# Patient Record
Sex: FEMALE | Race: BLACK OR AFRICAN AMERICAN | NOT HISPANIC OR LATINO | URBAN - METROPOLITAN AREA
[De-identification: names, ages, dates, MRNs, and addresses within clinical notes are randomized per-mention and may not be internally consistent; named-entity substitution may affect disease eponyms.]

---

## 2017-11-22 ENCOUNTER — NEW PATIENT (OUTPATIENT)
Dept: URBAN - METROPOLITAN AREA CLINIC 10 | Facility: CLINIC | Age: 72
End: 2017-11-22
Payer: COMMERCIAL

## 2017-11-22 PROCEDURE — 92083 EXTENDED VISUAL FIELD XM: CPT | Performed by: OPTOMETRIST

## 2017-11-22 PROCEDURE — 92004 COMPRE OPH EXAM NEW PT 1/>: CPT | Performed by: OPTOMETRIST

## 2017-11-22 PROCEDURE — 92015 DETERMINE REFRACTIVE STATE: CPT | Performed by: OPTOMETRIST

## 2017-11-22 PROCEDURE — 92133 CPTRZD OPH DX IMG PST SGM ON: CPT | Performed by: OPTOMETRIST

## 2017-11-22 RX ORDER — TRAVOPROST 0.04 MG/ML
0.004 % SOLUTION/ DROPS OPHTHALMIC
Qty: 3 | Refills: 4 | Status: INACTIVE
Start: 2017-11-22 | End: 2020-12-09

## 2017-11-22 RX ORDER — BRIMONIDINE TARTRATE 2 MG/ML
0.2 % SOLUTION/ DROPS OPHTHALMIC
Qty: 3 | Refills: 4 | Status: INACTIVE
Start: 2017-11-22 | End: 2020-12-09

## 2017-11-22 RX ORDER — DORZOLAMIDE HYDROCHLORIDE AND TIMOLOL MALEATE 20; 5 MG/ML; MG/ML
SOLUTION/ DROPS OPHTHALMIC
Qty: 3 | Refills: 4 | Status: INACTIVE
Start: 2017-11-22 | End: 2020-12-09

## 2017-11-22 RX ORDER — DORZOLAMIDE HYDROCHLORIDE AND TIMOLOL MALEATE 20; 5 MG/ML; MG/ML
SOLUTION/ DROPS OPHTHALMIC
Qty: 0 | Refills: 0 | Status: INACTIVE
Start: 2017-11-22 | End: 2018-12-08

## 2017-11-22 ASSESSMENT — INTRAOCULAR PRESSURE
OD: 46
OS: 23

## 2017-11-22 ASSESSMENT — VISUAL ACUITY
OS: 20/20
OD: 20/20

## 2017-11-22 ASSESSMENT — KERATOMETRY
OD: 43.75
OS: 43.13

## 2017-12-06 ENCOUNTER — FOLLOW UP ESTABLISHED (OUTPATIENT)
Dept: URBAN - METROPOLITAN AREA CLINIC 10 | Facility: CLINIC | Age: 72
End: 2017-12-06
Payer: COMMERCIAL

## 2017-12-06 PROCEDURE — 92012 INTRM OPH EXAM EST PATIENT: CPT | Performed by: OPTOMETRIST

## 2017-12-06 PROCEDURE — 76514 ECHO EXAM OF EYE THICKNESS: CPT | Performed by: OPTOMETRIST

## 2017-12-06 ASSESSMENT — INTRAOCULAR PRESSURE
OD: 16
OS: 15

## 2020-12-09 ENCOUNTER — NEW PATIENT (OUTPATIENT)
Dept: URBAN - METROPOLITAN AREA CLINIC 10 | Facility: CLINIC | Age: 75
End: 2020-12-09
Payer: COMMERCIAL

## 2020-12-09 PROCEDURE — 92004 COMPRE OPH EXAM NEW PT 1/>: CPT | Performed by: OPTOMETRIST

## 2020-12-09 RX ORDER — LATANOPROST 50 UG/ML
0.005 % SOLUTION OPHTHALMIC
Qty: 2.5 | Refills: 3 | Status: INACTIVE
Start: 2020-12-09 | End: 2021-02-25

## 2020-12-09 RX ORDER — DORZOLAMIDE HYDROCHLORIDE AND TIMOLOL MALEATE 20; 5 MG/ML; MG/ML
SOLUTION/ DROPS OPHTHALMIC
Qty: 2.5 | Refills: 4 | Status: INACTIVE
Start: 2020-12-09 | End: 2022-03-01

## 2020-12-09 ASSESSMENT — INTRAOCULAR PRESSURE
OD: 38
OS: 18
OD: 35

## 2020-12-09 ASSESSMENT — VISUAL ACUITY
OD: 20/HM
OS: 20/25

## 2021-01-14 ENCOUNTER — FOLLOW UP ESTABLISHED (OUTPATIENT)
Dept: URBAN - METROPOLITAN AREA CLINIC 10 | Facility: CLINIC | Age: 76
End: 2021-01-14
Payer: COMMERCIAL

## 2021-01-14 PROCEDURE — 92012 INTRM OPH EXAM EST PATIENT: CPT | Performed by: OPTOMETRIST

## 2021-01-14 RX ORDER — BRIMONIDINE TARTRATE 2 MG/ML
0.2 % SOLUTION/ DROPS OPHTHALMIC
Qty: 2.5 | Refills: 1 | Status: INACTIVE
Start: 2021-01-14 | End: 2021-02-08

## 2021-01-14 ASSESSMENT — INTRAOCULAR PRESSURE
OD: 27
OD: 25
OS: 20
OS: 18

## 2021-02-19 ENCOUNTER — FOLLOW UP ESTABLISHED (OUTPATIENT)
Dept: URBAN - METROPOLITAN AREA CLINIC 10 | Facility: CLINIC | Age: 76
End: 2021-02-19
Payer: COMMERCIAL

## 2021-02-19 DIAGNOSIS — H40.1133 PRIMARY OPEN-ANGLE GLAUCOMA, BILATERAL, SEVERE STAGE: Primary | ICD-10-CM

## 2021-02-19 DIAGNOSIS — H25.813 COMBINED FORMS OF AGE-RELATED CATARACT, BILATERAL: ICD-10-CM

## 2021-02-19 PROCEDURE — 99213 OFFICE O/P EST LOW 20 MIN: CPT | Performed by: OPTOMETRIST

## 2021-02-19 PROCEDURE — 92083 EXTENDED VISUAL FIELD XM: CPT | Performed by: OPTOMETRIST

## 2021-02-19 PROCEDURE — 92250 FUNDUS PHOTOGRAPHY W/I&R: CPT | Performed by: OPTOMETRIST

## 2021-02-19 ASSESSMENT — INTRAOCULAR PRESSURE
OD: 31
OS: 20
OS: 19
OD: 24

## 2021-04-02 ENCOUNTER — OFFICE VISIT (OUTPATIENT)
Dept: URBAN - METROPOLITAN AREA CLINIC 10 | Facility: CLINIC | Age: 76
End: 2021-04-02
Payer: COMMERCIAL

## 2021-04-02 PROCEDURE — 99213 OFFICE O/P EST LOW 20 MIN: CPT | Performed by: OPTOMETRIST

## 2021-04-02 RX ORDER — NETARSUDIL 0.2 MG/ML
0.02 % SOLUTION/ DROPS OPHTHALMIC; TOPICAL
Qty: 5 | Refills: 6 | Status: ACTIVE
Start: 2021-04-02

## 2021-04-02 ASSESSMENT — INTRAOCULAR PRESSURE
OS: 17
OD: 18

## 2021-04-02 NOTE — IMPRESSION/PLAN
Impression: Combined forms of age-related cataract, bilateral: H25.813. Plan: Significant. Will monitor for now. Poor prognosis OD due to glaucoma.

## 2021-04-02 NOTE — IMPRESSION/PLAN
Impression: Primary open-angle glaucoma, severe stage, bilateral
--S/p trab OS early 2000s
--Treatment elsewhere but has not taken any gts in years. Previously was on max topical therapy by hx per pt. --Pachs avg
--limited improvement c brimonidine Plan: IOP improved c addition of Rhopressa. Pt. lost to f/u from 7558-5950. Vision OD reduced to HM. HVF: dense general constriction OU. FN N/A. Cont. Cosopt BID OU Cont. Latanoprost qhs OU. Cont. Rhopressa qhs OD and add qhs OS. Last OCT RNFL showed severe thinning OU and severe HVF loss OU. RTC 4 months for Complete Exam and HVF 24-2 OS.

## 2021-09-21 ENCOUNTER — OFFICE VISIT (OUTPATIENT)
Dept: URBAN - METROPOLITAN AREA CLINIC 10 | Facility: CLINIC | Age: 76
End: 2021-09-21
Payer: COMMERCIAL

## 2021-09-21 PROCEDURE — 99214 OFFICE O/P EST MOD 30 MIN: CPT | Performed by: OPTOMETRIST

## 2021-09-21 PROCEDURE — 92134 CPTRZ OPH DX IMG PST SGM RTA: CPT | Performed by: OPTOMETRIST

## 2021-09-21 PROCEDURE — 92083 EXTENDED VISUAL FIELD XM: CPT | Performed by: OPTOMETRIST

## 2021-09-21 ASSESSMENT — INTRAOCULAR PRESSURE
OS: 16
OD: 18
OD: 19
OS: 18

## 2021-09-21 ASSESSMENT — VISUAL ACUITY
OD: CF 2FT
OS: 20/40

## 2021-09-21 NOTE — IMPRESSION/PLAN
Impression: Primary open-angle glaucoma, severe stage, bilateral
--S/p trab OS early 2000s
--Treatment elsewhere but has not taken any gts in years. Previously was on max topical therapy by hx per pt. --Pachs avg
--limited improvement c brimonidine Plan: IOP appears stable OU but pt. stopped Rhopressa and IOP was sub-optimal prior to starting. Pt. lost to f/u from 5071-1885. Vision OD reduced to HM. HVF: dense general constriction OU. FN N/A. Cont. Cosopt BID OU Cont. Latanoprost qhs OU. Stop Rhopressa Last OCT RNFL showed severe thinning OU and severe HVF loss OU. Consult glaucoma for consideration of diode OD vs sx. Consider cataract sx OS c MIGS.

## 2021-09-21 NOTE — IMPRESSION/PLAN
Impression: Combined forms of age-related cataract, bilateral: H25.813. Plan: Cataracts account for the patient's complaints. Discussed all risks, benefits, procedures and recovery. Patient understands changing glasses will not improve vision. Patient desires to have surgery, recommend phacoemulsification with intraocular lens. Will consult glaucoma for consideration of cataract sx OS and MIGS. --Consider MIGS. PT. understands this will not eliminate need for gtts. --Poor prognosis OD due to glaucoma.

## 2021-11-12 ENCOUNTER — OFFICE VISIT (OUTPATIENT)
Dept: URBAN - METROPOLITAN AREA CLINIC 10 | Facility: CLINIC | Age: 76
End: 2021-11-12
Payer: MEDICARE

## 2021-11-12 PROCEDURE — 99204 OFFICE O/P NEW MOD 45 MIN: CPT | Performed by: OPHTHALMOLOGY

## 2021-11-12 PROCEDURE — 92020 GONIOSCOPY: CPT | Performed by: OPHTHALMOLOGY

## 2021-11-12 PROCEDURE — 76514 ECHO EXAM OF EYE THICKNESS: CPT | Performed by: OPHTHALMOLOGY

## 2021-11-12 PROCEDURE — 92133 CPTRZD OPH DX IMG PST SGM ON: CPT | Performed by: OPHTHALMOLOGY

## 2021-11-12 ASSESSMENT — INTRAOCULAR PRESSURE
OS: 21
OD: 25

## 2021-11-12 NOTE — IMPRESSION/PLAN
Impression: Primary open-angle glaucoma, severe stage, bilateral
--S/p trab OS early 2000s
--Treatment elsewhere but has not taken any gts in years. Previously was on max topical therapy by hx per pt. --Pachs avg
--limited improvement c brimonidine Plan: Pt has Glaucoma    Gonio : bare open to ss 1+pigment OU / posterior synechiae OS       Pachs: 541/534     Today's IOP :25/21      Tmax  :  46/23 Target IOP low to mid teens Fhx of Glaucoma Mother Left eye is the better seeing eye (Last vf &date )9/21/21 reviewed 11/12/21 C/D: .7x.7 pallor OU/ OS healthier OCT: 11/12/21 61 OD/ 50 OS severe thinning OU Pt denies Sulfa Allergy   // Pt denies Lung /Heart dx Plan :
1. Continue Cosopt BID OU and Latanoprost QPM OU 2. See Dr. Fady Barbosa for dilated Pre-op (tech to [facility] and Dilate) Needs ASCAN and H&P 3. Return for cat surgery see cat plan

## 2021-11-12 NOTE — IMPRESSION/PLAN
Impression: Combined forms of age-related cataract, bilateral: H25.813. Plan: Discussed cataract diagnosis with the patient. Discussed risks, benefits and alternatives to surgery including but not limited to: bleeding, infection, risk of vision loss, loss of the eye, need for other surgery. Patient voiced understanding and wishes to proceed. Patient elects surgical treatment. Specialty lens options discussed and pt declines. Patient desires surgery OU (( AIM  Distance OU,)) Patient understands the need for glasses after surgery for BCVA. MIGS Discussed with pt limitations of MIGS device and it does not replace  Glaucoma eye drops and would have to continue treatment and would still need glasses after surgery. Understands possible use of iris stretch. Risk Level: 
Right eye first (PC IOL (Standard W/ IStent/canaloplasty /Hydrus )  (NO ORA, LRI, LENSX) Left eye second(PC IOL (Standard W/ IStent/canaloplasty /Hydrus )  (NO ORA, LRI, LENSX) Minutes Drops

## 2021-12-29 ENCOUNTER — TESTING ONLY (OUTPATIENT)
Dept: URBAN - METROPOLITAN AREA CLINIC 10 | Facility: CLINIC | Age: 76
End: 2021-12-29
Payer: MEDICARE

## 2021-12-29 DIAGNOSIS — Z01.818 ENCOUNTER FOR OTHER PREPROCEDURAL EXAMINATION: Primary | ICD-10-CM

## 2021-12-29 PROCEDURE — 99202 OFFICE O/P NEW SF 15 MIN: CPT | Performed by: PHYSICIAN ASSISTANT

## 2022-03-31 ENCOUNTER — OFFICE VISIT (OUTPATIENT)
Dept: URBAN - METROPOLITAN AREA CLINIC 10 | Facility: CLINIC | Age: 77
End: 2022-03-31
Payer: MEDICARE

## 2022-03-31 PROCEDURE — 92134 CPTRZ OPH DX IMG PST SGM RTA: CPT | Performed by: OPTOMETRIST

## 2022-03-31 PROCEDURE — 99214 OFFICE O/P EST MOD 30 MIN: CPT | Performed by: OPTOMETRIST

## 2022-03-31 ASSESSMENT — INTRAOCULAR PRESSURE
OD: 22
OS: 20

## 2022-03-31 ASSESSMENT — VISUAL ACUITY
OS: 20/25
OD: 20/400

## 2022-03-31 NOTE — IMPRESSION/PLAN
Impression: Primary open-angle glaucoma, severe stage, bilateral
--S/p trab OS early 2000s
--Treatment elsewhere but has not taken any gts in years. Previously was on max topical therapy by hx per pt. --Pachs avg
--limited improvement c brimonidine Plan: Pt has Glaucoma    Gonio : bare open to ss 1+pigment OU / posterior synechiae OS       Pachs: 541/534        Tmax  :  46/23 Target IOP low to mid teens Fhx of Glaucoma Mother Left eye is the better seeing eye (Last vf &date )9/21/21 reviewed 11/12/21 C/D: .7x.7 pallor OU/ OS healthier OCT: 11/12/21 61 OD/ 50 OS severe thinning OU Pt denies Sulfa Allergy   // Pt denies Lung /Heart dx Plan :
1. Continue Cosopt BID OU and Latanoprost QPM OU 2. Cataract sx c MIGS is scheduled c Dr. Andrew Hernandez.

## 2022-03-31 NOTE — IMPRESSION/PLAN
Impression: Combined forms of age-related cataract, bilateral: H25.813. Plan: Cataracts account for the patient's complaints. Discussed all risks, benefits, procedures and recovery. Patient understands changing glasses will not improve vision. Patient desires to have surgery, recommend phacoemulsification with intraocular lens. --Plan MIGS. PT. understands this will not eliminate need for gtts. --Poor prognosis OD due to glaucoma. --Plan standard IOL c distance aim OU.

## 2022-04-07 ENCOUNTER — TESTING ONLY (OUTPATIENT)
Dept: URBAN - METROPOLITAN AREA CLINIC 10 | Facility: CLINIC | Age: 77
End: 2022-04-07
Payer: COMMERCIAL

## 2022-04-07 PROCEDURE — 92083 EXTENDED VISUAL FIELD XM: CPT | Performed by: OPHTHALMOLOGY

## 2022-04-15 ENCOUNTER — ADULT PHYSICAL (OUTPATIENT)
Dept: URBAN - METROPOLITAN AREA CLINIC 10 | Facility: CLINIC | Age: 77
End: 2022-04-15
Payer: COMMERCIAL

## 2022-04-15 DIAGNOSIS — Z01.818 ENCOUNTER FOR OTHER PREPROCEDURAL EXAMINATION: Primary | ICD-10-CM

## 2022-04-15 DIAGNOSIS — H25.813 COMBINED FORMS OF AGE-RELATED CATARACT, BILATERAL: ICD-10-CM

## 2022-04-15 DIAGNOSIS — H40.1133 PRIMARY OPEN-ANGLE GLAUCOMA, BILATERAL, SEVERE STAGE: ICD-10-CM

## 2022-04-15 PROCEDURE — 99213 OFFICE O/P EST LOW 20 MIN: CPT | Performed by: PHYSICIAN ASSISTANT

## 2022-04-18 ENCOUNTER — OFFICE VISIT (OUTPATIENT)
Dept: URBAN - METROPOLITAN AREA CLINIC 10 | Facility: CLINIC | Age: 77
End: 2022-04-18
Payer: MEDICARE

## 2022-04-18 DIAGNOSIS — H25.13 AGE-RELATED NUCLEAR CATARACT, BILATERAL: ICD-10-CM

## 2022-04-18 DIAGNOSIS — H40.1133 PRIMARY OPEN-ANGLE GLAUCOMA, BILATERAL, SEVERE STAGE: Primary | ICD-10-CM

## 2022-04-18 DIAGNOSIS — H04.123 DRY EYE SYNDROME OF BILATERAL LACRIMAL GLANDS: ICD-10-CM

## 2022-04-18 PROCEDURE — 99214 OFFICE O/P EST MOD 30 MIN: CPT | Performed by: OPHTHALMOLOGY

## 2022-04-18 PROCEDURE — 92083 EXTENDED VISUAL FIELD XM: CPT | Performed by: OPHTHALMOLOGY

## 2022-04-18 RX ORDER — KETOROLAC TROMETHAMINE 5 MG/ML
0.5 % SOLUTION OPHTHALMIC
Qty: 0 | Refills: 3 | Status: ACTIVE
Start: 2022-04-18

## 2022-04-18 RX ORDER — OFLOXACIN 3 MG/ML
0.3 % SOLUTION/ DROPS OPHTHALMIC
Qty: 5 | Refills: 1 | Status: ACTIVE
Start: 2022-04-18

## 2022-04-18 RX ORDER — PREDNISOLONE ACETATE 10 MG/ML
1 % SUSPENSION/ DROPS OPHTHALMIC
Qty: 10 | Refills: 1 | Status: ACTIVE
Start: 2022-04-18

## 2022-04-18 ASSESSMENT — INTRAOCULAR PRESSURE
OS: 16
OD: 17

## 2022-04-18 NOTE — IMPRESSION/PLAN
Impression: Age-related nuclear cataract, bilateral: H25.13. Plan: (OS then OD)( DISTANCE AIM -0.25 OU: +  DEXYCU 1st choice, + Block, NO ORA OU, NO LenSx OU, + MIGS OU ( +iOMNI,HYDRUS), )) - 

10 Min

TOPICAL: Oflox,Pred & Ket Discussed cataract diagnosis with the patient. Appropriate testing ordered for cataract diagnosis prior to Preop. Risks and benefits of surgical treatment were discussed and understood. Patient desires surgical treatment. Discussed lens options with pt and pt is ok with wearing glasses after surgery. Patient desires surgery to proceed with surgery  OS THEN OD. Both eyes examined, medically necessary due to impact in activities of daily living. Discussed with pt limitations of MIGS device and it does not replace  Glaucoma eye drops and would have to continue treatment and would still need glasses after surgery. Discussed higher risks with smaller pupil and discussed iris stretch and higher risks of bleeding.  Discussed there is a chance of developing capsular haze after surgery, which may be corrected with laser/yag

## 2022-04-18 NOTE — IMPRESSION/PLAN
Impression: Dry eye syndrome of bilateral lacrimal glands: H04.123. Plan: Recommend patient use Art tears qid-tid. Dry eyes account for the patient's complaints. There is no evidence of permanent changes to the cornea. Explained condition does not have a cure and will need artificial tears for maintenance.

## 2022-04-18 NOTE — IMPRESSION/PLAN
Impression: Primary open-angle glaucoma, severe stage, bilateral
--S/p trab OS early 2000s
--Treatment elsewhere but has not taken any gts in years. Previously was on max topical therapy by hx per pt. --Pachs avg
--limited improvement c brimonidine Plan: Pt has Glaucoma    Gonio : bare open to ss 1+pigment OU / posterior synechiae OS       Pachs: 541/534     Today's IOP :25/21      Tmax  :  46/23 Target IOP low to mid teens Fhx of Glaucoma Mother Left eye is the better seeing eye (Last vf &date ) 04/07/22 reviewed 04/18/22 C/D: .7x.7 pallor OU/ OS healthier OCT: 11/12/21 61 OD/ 50 OS severe thinning OU Pt denies Sulfa Allergy // Pt denies Lung /Heart dx Plan :
1. Continue Cosopt BID OU and Latanoprost QPM OU 2.  Return as scheduled for cat sx with migs ou

## 2022-04-25 ENCOUNTER — SURGERY (OUTPATIENT)
Dept: URBAN - METROPOLITAN AREA SURGERY 5 | Facility: SURGERY | Age: 77
End: 2022-04-25
Payer: MEDICARE

## 2022-04-25 DIAGNOSIS — H25.13 AGE-RELATED NUCLEAR CATARACT, BILATERAL: Primary | ICD-10-CM

## 2022-04-25 PROCEDURE — 66175 TRLUML DIL AQ O/F CAN W/ST: CPT | Performed by: OPHTHALMOLOGY

## 2022-04-26 ENCOUNTER — POST-OPERATIVE VISIT (OUTPATIENT)
Dept: URBAN - METROPOLITAN AREA CLINIC 10 | Facility: CLINIC | Age: 77
End: 2022-04-26
Payer: COMMERCIAL

## 2022-04-26 DIAGNOSIS — Z48.810 ENCOUNTER FOR SURGICAL AFTERCARE FOLLOWING SURGERY ON A SENSE ORGAN: Primary | ICD-10-CM

## 2022-04-26 PROCEDURE — 99024 POSTOP FOLLOW-UP VISIT: CPT | Performed by: OPTOMETRIST

## 2022-04-26 ASSESSMENT — INTRAOCULAR PRESSURE
OS: 9
OD: 20

## 2022-04-26 NOTE — IMPRESSION/PLAN
Impression: S/P Cataract Extraction by phacoemulsification with IOL placement; OMNI, Hydrus OS - 1 Day. Encounter for surgical aftercare following surgery on a sense organ  Z48.810. Post operative instructions reviewed - Plan: Dexycu c gtts Begin oflxoacin qid x 1 wk. Begin ketoralac qid x 1 wk then taper weekly. Begin pred acetate qid x 1 wk then taper weekly. Cont all glaucoma gtts as directed. RTC as scheduled next week for Mrx and IOP check.

## 2022-05-03 ENCOUNTER — POST-OPERATIVE VISIT (OUTPATIENT)
Dept: URBAN - METROPOLITAN AREA CLINIC 10 | Facility: CLINIC | Age: 77
End: 2022-05-03
Payer: MEDICARE

## 2022-05-03 PROCEDURE — 99024 POSTOP FOLLOW-UP VISIT: CPT | Performed by: OPTOMETRIST

## 2022-05-03 ASSESSMENT — INTRAOCULAR PRESSURE
OD: 29
OS: 22

## 2022-05-03 ASSESSMENT — VISUAL ACUITY
OD: 20/400
OS: 20/25

## 2022-05-03 NOTE — IMPRESSION/PLAN
Impression: S/P Cataract Extraction by phacoemulsification with IOL placement; OMNI, Hydrus OS - 8 Days. Encounter for surgical aftercare following surgery on a sense organ  Z48.810. Condition is improving - Plan: Cleared for surgery second eye Pt happy c vision. Cont glc gtts OD. Cont. to monitor off gtts. OS. Cont PredAce and ketorolac OS Confirmed Distance aim OD

## 2022-05-09 ENCOUNTER — SURGERY (OUTPATIENT)
Dept: URBAN - METROPOLITAN AREA SURGERY 5 | Facility: SURGERY | Age: 77
End: 2022-05-09
Payer: MEDICARE

## 2022-05-09 DIAGNOSIS — H25.11 AGE-RELATED NUCLEAR CATARACT, RIGHT EYE: Primary | ICD-10-CM

## 2022-05-09 PROCEDURE — 66175 TRLUML DIL AQ O/F CAN W/ST: CPT | Performed by: OPHTHALMOLOGY

## 2022-05-09 PROCEDURE — 66174 TRLUML DIL AQ O/F CAN W/O ST: CPT | Performed by: OPHTHALMOLOGY

## 2022-05-10 ENCOUNTER — POST-OPERATIVE VISIT (OUTPATIENT)
Dept: URBAN - METROPOLITAN AREA CLINIC 10 | Facility: CLINIC | Age: 77
End: 2022-05-10

## 2022-05-10 DIAGNOSIS — Z96.1 PRESENCE OF INTRAOCULAR LENS: Primary | ICD-10-CM

## 2022-05-10 PROCEDURE — 99024 POSTOP FOLLOW-UP VISIT: CPT | Performed by: OPTOMETRIST

## 2022-05-10 ASSESSMENT — INTRAOCULAR PRESSURE
OD: 13
OS: 16

## 2022-05-10 NOTE — IMPRESSION/PLAN
Impression: S/P Cataract Extraction/IOL (BDP); OMNI/HYDRUS OD - 1 Day. Presence of intraocular lens  Z96.1. Post operative instructions reviewed - Plan: Begin oflxoacin qid x 1 wk. Begin ketoralac qid x 1 wk then taper weekly. Begin pred acetate qid x 1 wk then taper weekly. Given level of glc. will cont. gtts. as well for now. RTC as scheduled c Dr. Rios Payment.

## 2022-05-12 ENCOUNTER — POST-OPERATIVE VISIT (OUTPATIENT)
Dept: URBAN - METROPOLITAN AREA CLINIC 10 | Facility: CLINIC | Age: 77
End: 2022-05-12
Payer: COMMERCIAL

## 2022-05-12 PROCEDURE — 99024 POSTOP FOLLOW-UP VISIT: CPT | Performed by: OPTOMETRIST

## 2022-05-12 ASSESSMENT — INTRAOCULAR PRESSURE
OS: 18
OD: 18

## 2022-05-23 ENCOUNTER — OFFICE VISIT (OUTPATIENT)
Dept: URBAN - METROPOLITAN AREA CLINIC 10 | Facility: CLINIC | Age: 77
End: 2022-05-23
Payer: COMMERCIAL

## 2022-05-23 DIAGNOSIS — H40.1133 PRIMARY OPEN-ANGLE GLAUCOMA, BILATERAL, SEVERE STAGE: Primary | ICD-10-CM

## 2022-05-23 PROCEDURE — 99024 POSTOP FOLLOW-UP VISIT: CPT | Performed by: OPHTHALMOLOGY

## 2022-05-23 ASSESSMENT — INTRAOCULAR PRESSURE
OD: 21
OS: 19

## 2022-05-23 ASSESSMENT — VISUAL ACUITY
OS: 20/20
OD: HM

## 2022-05-23 NOTE — IMPRESSION/PLAN
Impression: Presence of intraocular lens: Z96.1. Plan: Doing well post op 1+ PCO OD - given HM status do not recommend laser now - can consider in the future

## 2022-05-23 NOTE — IMPRESSION/PLAN
Impression: Primary open-angle glaucoma, severe stage, bilateral
--S/p trab OS early 2000s
--s/p Cataract Extraction/IOL (BDP); OMNI/HYDRUS OU 2022 (with Dr. Christina Dakins) --Treatment elsewhere but has not taken any gts in years. Previously was on max topical therapy by hx per pt. --Pachs avg
--limited improvement c brimonidine Plan: Pt has Glaucoma    Gonio : bare open to ss 1+pigment OU / posterior synechiae OS       Pachs: 541/534     Today's IOP :21/19      Tmax  :  46/23 Target IOP low teens OU Fhx of Glaucoma Mother Left eye is the better seeing eye (Last vf &date ) 04/07/22 reviewed 04/18/22 C/D: .7x.7 pallor OU/ OS healthier OCT: 11/12/21 61 OD/ 51 OS severe thinning OU Pt denies Sulfa Allergy // Pt denies Lung /Heart dx Plan :
1. Restart Cosopt BID OU Latanoprost QPM OU
2. s/p cataract sx with migs ou, Patient healing well. Continue post-operative medications as instructed. Will continue to monitor. Patient to keep post-operative appointments as instructed. 3. IOP today is elevated, will have patient restart glaucoma drops. 4. RTC in 3 weeks with Dr. Dallas Bates for IOP check - need IOP in low teens 5.  Return in 3 months with Dr. Christina Dakins for IOP check and 10-2

## 2022-06-16 ENCOUNTER — OFFICE VISIT (OUTPATIENT)
Dept: URBAN - METROPOLITAN AREA CLINIC 10 | Facility: CLINIC | Age: 77
End: 2022-06-16
Payer: COMMERCIAL

## 2022-06-16 DIAGNOSIS — H40.1133 PRIMARY OPEN-ANGLE GLAUCOMA, BILATERAL, SEVERE STAGE: Primary | ICD-10-CM

## 2022-06-16 PROCEDURE — 99024 POSTOP FOLLOW-UP VISIT: CPT | Performed by: OPTOMETRIST

## 2022-06-16 ASSESSMENT — INTRAOCULAR PRESSURE
OS: 12
OD: 20
OD: 14
OS: 6

## 2022-06-16 NOTE — IMPRESSION/PLAN
Impression: Primary open-angle glaucoma, severe stage, bilateral
--S/p trab OS early 2000s
--s/p Cataract Extraction/IOL (BDP); OMNI/HYDRUS OU 2022 (with Dr. Sharyle Nones) --Treatment elsewhere but has not taken any gts in years. Previously was on max topical therapy by hx per pt. --Pachs avg
--limited improvement c brimonidine Plan: Pt has Glaucoma    Gonio : bare open to ss 1+pigment OU / posterior synechiae OS       Pachs: 541/534     Today's IOP : 14/6      Tmax  :  46/23 Target IOP low teens OU Fhx of Glaucoma Mother Left eye is the better seeing eye (Last vf &date ) 04/07/22 reviewed 04/18/22 C/D: .7x.7 pallor OU/ OS healthier OCT: 11/12/21 61 OD/ 50 OS severe thinning OU Pt denies Sulfa Allergy // Pt denies Lung /Heart dx Plan :
1. Continue Cosopt BID OU Latanoprost QPM OD. D/c Latanoprost QPM OS, will attempt to reduce gts. 2. s/p cataract sx with migs ou, Patient healing well. Continue post-operative medications as instructed. Will continue to monitor. Patient to keep post-operative appointments as instructed. 3. IOP today is controlled with gts OD, excellent OS. 
4. RTC as scheduled with Dr. Sharyle Nones for IOP check and 10-2

## 2022-11-21 ENCOUNTER — OFFICE VISIT (OUTPATIENT)
Dept: URBAN - METROPOLITAN AREA CLINIC 10 | Facility: CLINIC | Age: 77
End: 2022-11-21
Payer: MEDICARE

## 2022-11-21 DIAGNOSIS — H52.4 PRESBYOPIA: ICD-10-CM

## 2022-11-21 DIAGNOSIS — H40.1133 PRIMARY OPEN-ANGLE GLAUCOMA, BILATERAL, SEVERE STAGE: Primary | ICD-10-CM

## 2022-11-21 DIAGNOSIS — H04.123 DRY EYE SYNDROME OF BILATERAL LACRIMAL GLANDS: ICD-10-CM

## 2022-11-21 PROCEDURE — 92083 EXTENDED VISUAL FIELD XM: CPT | Performed by: OPTOMETRIST

## 2022-11-21 PROCEDURE — 99213 OFFICE O/P EST LOW 20 MIN: CPT | Performed by: OPTOMETRIST

## 2022-11-21 ASSESSMENT — INTRAOCULAR PRESSURE
OD: 12
OS: 12

## 2022-11-21 NOTE — IMPRESSION/PLAN
Impression: Primary open-angle glaucoma, severe stage, bilateral
--S/p trab OS early 2000s
--s/p Cataract Extraction/IOL (BDP); OMNI/HYDRUS OU 2022 (with Dr. Alexis Alcocer) --Treatment elsewhere but has not taken any gts in years. Previously was on max topical therapy by hx per pt. --Pachs avg
--limited improvement c brimonidine Plan: Pt has Glaucoma    Gonio : bare open to ss 1+pigment OU / posterior synechiae OS       Pachs: 541/534     Today's IOP : 12/12      Tmax  :  46/23 Target IOP low teens OU Fhx of Glaucoma Mother Left eye is the better seeing eye HVF 10-2 today overall depression OD, [place] OS
C/D: .7x.7 pallor OU/ OS healthier OCT: 11/12/21 61 OD/ 50 OS severe thinning OU Pt denies Sulfa Allergy // Pt denies Lung /Heart dx Plan :
1. Continue Cosopt BID OU Latanoprost QPM OD. Doing well off of Latanoprost OS. 2. s/p cataract sx with migs ou,
3. IOP today is controlled with gts OD and OS 4. Re-establish care c glaucoma team. Refer to Dr. Cheryl Mantilla 3 mo establish care.

## 2023-08-07 ENCOUNTER — OFFICE VISIT (OUTPATIENT)
Dept: URBAN - METROPOLITAN AREA CLINIC 10 | Facility: CLINIC | Age: 78
End: 2023-08-07
Payer: COMMERCIAL

## 2023-08-07 DIAGNOSIS — H43.813 VITREOUS DEGENERATION, BILATERAL: ICD-10-CM

## 2023-08-07 DIAGNOSIS — Z96.1 PRESENCE OF INTRAOCULAR LENS: ICD-10-CM

## 2023-08-07 DIAGNOSIS — H04.123 DRY EYE SYNDROME OF BILATERAL LACRIMAL GLANDS: ICD-10-CM

## 2023-08-07 DIAGNOSIS — H40.1133 PRIMARY OPEN-ANGLE GLAUCOMA, BILATERAL, SEVERE STAGE: Primary | ICD-10-CM

## 2023-08-07 PROCEDURE — 92133 CPTRZD OPH DX IMG PST SGM ON: CPT | Performed by: STUDENT IN AN ORGANIZED HEALTH CARE EDUCATION/TRAINING PROGRAM

## 2023-08-07 PROCEDURE — 92020 GONIOSCOPY: CPT | Performed by: STUDENT IN AN ORGANIZED HEALTH CARE EDUCATION/TRAINING PROGRAM

## 2023-08-07 PROCEDURE — 99214 OFFICE O/P EST MOD 30 MIN: CPT | Performed by: STUDENT IN AN ORGANIZED HEALTH CARE EDUCATION/TRAINING PROGRAM

## 2023-08-07 RX ORDER — DORZOLAMIDE HYDROCHLORIDE AND TIMOLOL MALEATE 20; 5 MG/ML; MG/ML
SOLUTION/ DROPS OPHTHALMIC
Qty: 10 | Refills: 3 | Status: ACTIVE
Start: 2023-08-07

## 2023-08-07 ASSESSMENT — INTRAOCULAR PRESSURE
OS: 20
OD: 18

## 2023-09-18 ENCOUNTER — OFFICE VISIT (OUTPATIENT)
Dept: URBAN - METROPOLITAN AREA CLINIC 10 | Facility: CLINIC | Age: 78
End: 2023-09-18
Payer: COMMERCIAL

## 2023-09-18 DIAGNOSIS — H40.1133 PRIMARY OPEN-ANGLE GLAUCOMA, BILATERAL, SEVERE STAGE: Primary | ICD-10-CM

## 2023-09-18 DIAGNOSIS — H04.123 DRY EYE SYNDROME OF BILATERAL LACRIMAL GLANDS: ICD-10-CM

## 2023-09-18 PROCEDURE — 99214 OFFICE O/P EST MOD 30 MIN: CPT | Performed by: STUDENT IN AN ORGANIZED HEALTH CARE EDUCATION/TRAINING PROGRAM

## 2023-09-18 RX ORDER — LATANOPROST 50 UG/ML
0.005 % SOLUTION OPHTHALMIC
Qty: 2.5 | Refills: 0 | Status: ACTIVE
Start: 2023-09-18

## 2023-09-18 RX ORDER — PREDNISOLONE ACETATE 10 MG/ML
1 % SUSPENSION/ DROPS OPHTHALMIC
Qty: 5 | Refills: 1 | Status: ACTIVE
Start: 2023-09-18

## 2023-09-18 RX ORDER — DORZOLAMIDE HYDROCHLORIDE AND TIMOLOL MALEATE 20; 5 MG/ML; MG/ML
SOLUTION/ DROPS OPHTHALMIC
Qty: 10 | Refills: 3 | Status: ACTIVE
Start: 2023-09-18

## 2023-09-18 ASSESSMENT — INTRAOCULAR PRESSURE
OD: 18
OS: 17